# Patient Record
Sex: MALE | Race: WHITE | URBAN - METROPOLITAN AREA
[De-identification: names, ages, dates, MRNs, and addresses within clinical notes are randomized per-mention and may not be internally consistent; named-entity substitution may affect disease eponyms.]

---

## 2020-01-08 ENCOUNTER — APPOINTMENT (RX ONLY)
Dept: URBAN - METROPOLITAN AREA CLINIC 139 | Facility: CLINIC | Age: 35
Setting detail: DERMATOLOGY
End: 2020-01-08

## 2020-01-08 DIAGNOSIS — L81.8 OTHER SPECIFIED DISORDERS OF PIGMENTATION: ICD-10-CM

## 2020-01-08 PROCEDURE — ? LASER TATTOO REMOVAL

## 2020-01-08 ASSESSMENT — LOCATION DETAILED DESCRIPTION DERM: LOCATION DETAILED: LEFT KNEE

## 2020-01-08 ASSESSMENT — LOCATION ZONE DERM: LOCATION ZONE: LEG

## 2020-01-08 ASSESSMENT — LOCATION SIMPLE DESCRIPTION DERM: LOCATION SIMPLE: LEFT KNEE

## 2020-01-08 NOTE — PROCEDURE: LASER TATTOO REMOVAL
Detail Level: Detailed
Anesthesia Volume In Cc: 0
Fluence: 3.6
Consent: Written consent obtained, risks reviewed including but not limited to crusting, scabbing, blistering, scarring, darker or lighter pigmentary change, systemic reactions, ulceration, incidental hair removal, bruising, and/or incomplete removal.
Laser Type: Q-switched Nd:Yag 1064nm
Laser Type: Q-switched Alexandrite 755nm
Spot Size In Mm: 2
Pre-Procedure Text: The treatment areas were cleaned and treated with the laser parameters noted above.
Post-Procedure Text: Vaseline and ice applied. Post care reviewed with patient.
Post-Care Instructions: I reviewed with the patient in detail post-care instructions. Patient should apply vaseline twice daily to tattoo and keep it covered with a non-stick adhesive dressing.
Price (Use Numbers Only, No Special Characters Or $): 40.00
Anesthesia Type: 1% lidocaine with epinephrine
Endpoint Text: Immediate endpoint: skin whitening and pinpoint bleeding.
Spot Size In Mm: 3